# Patient Record
Sex: MALE | Race: WHITE | NOT HISPANIC OR LATINO | Employment: OTHER | URBAN - METROPOLITAN AREA
[De-identification: names, ages, dates, MRNs, and addresses within clinical notes are randomized per-mention and may not be internally consistent; named-entity substitution may affect disease eponyms.]

---

## 2017-01-04 RX ORDER — SODIUM CHLORIDE, SODIUM LACTATE, POTASSIUM CHLORIDE, CALCIUM CHLORIDE 600; 310; 30; 20 MG/100ML; MG/100ML; MG/100ML; MG/100ML
125 INJECTION, SOLUTION INTRAVENOUS CONTINUOUS
Status: CANCELLED | OUTPATIENT
Start: 2017-01-04

## 2017-01-05 ENCOUNTER — HOSPITAL ENCOUNTER (OUTPATIENT)
Facility: AMBULARY SURGERY CENTER | Age: 77
Setting detail: OUTPATIENT SURGERY
Discharge: NURSING, REHAB OR LONG-TERM CARE | End: 2017-01-05
Attending: INTERNAL MEDICINE | Admitting: INTERNAL MEDICINE
Payer: MEDICARE

## 2017-01-05 VITALS
SYSTOLIC BLOOD PRESSURE: 103 MMHG | HEART RATE: 116 BPM | TEMPERATURE: 97.3 F | RESPIRATION RATE: 18 BRPM | OXYGEN SATURATION: 95 % | DIASTOLIC BLOOD PRESSURE: 57 MMHG

## 2018-01-09 NOTE — PROCEDURES
Procedures by Raquel Deleon MD at 11/27/2016  11:39 AM      Author:  Raquel Deleon MD Service:  Critical Care/ICU Author Type:  Anesthesiologist     Filed:  11/27/2016 12:10 PM Date of Service:  11/27/2016 11:39 AM Status:  Signed     :  Raquel Deleon MD (Anesthesiologist)         Procedure Orders:       1  Insert arterial line [id number] ordered by Raquel Deleon MD at 11/27/16 1209                 Post-procedure Diagnoses:       1  Acute respiratory failure with hypoxia [J96 01]                   Arterial Line Insertion  Date/Time: 11/27/2016 12:09 PM  Performed by: Wicho Díaz by: Geovanna Reilly     Patient location:  Bedside  Consent:     Consent obtained:  Emergent situation  Universal protocol:     Immediately prior to procedure a time out was called: yes      Patient identity confirmed:  Arm band  Indications:     Indications: frequent labs / infusion    Pre-procedure details:     Skin preparation:  Chlorhexidine    Preparation: Patient was prepped and draped in sterile fashion    Anesthesia (see MAR for exact dosages): Anesthesia method:  Local infiltration  Procedure details:     Location / Tip of Catheter:  Radial    Laterality:  Left    Needle gauge:  20 G    Placement technique:  Percutaneous and Seldinger    Number of attempts:  2    Transducer: waveform confirmed    Post-procedure details:     Post-procedure:  Sterile dressing applied and wrist guard applied    CMS:  Normal    Patient tolerance of procedure:   Tolerated well, no immediate complications                     Received for:Provider  EPIC   Nov 27 2016 12:11PM [facility] Standard Time

## 2018-01-11 NOTE — PROCEDURES
Procedures by Lily Bergman RN at  10/5/2016  5:28 PM      Author:  Lily Bergman RN Service:  Oncology-Medical Author Type:  Registered Nurse     Filed:  10/5/2016  5:33 PM Date of Service:  10/5/2016  5:28 PM Status:  Signed     :  Lily Bergman RN (Registered Nurse)         Procedure Orders:       1  Insert PICC line [id number] ordered by Gloria Ray at 10/05/16 1339                    Insert PICC line  Date/Time: 10/5/2016 5:28 PM  Performed by: Benedicto Harper by: Lazaro Stark     Patient location:  Bedside  Other Assisting Provider:  No    Consent:     Consent obtained:  Written    Consent given by:  Guardian    Procedural risks discussed: Consent obtained by physician  [place] protocol:     Procedure explained and questions answered to patient or proxy's satisfaction: yes      Relevant documents present and verified: yes      Test results available and properly labeled: yes       Imaging studies available: N/A  Required blood products, implants, devices, and special equipment available: yes      Site/side marked: yes      Immediately prior to procedure, a time out was called: yes       Patient identity confirmed:  Verbally with patient, arm band, provided demographic data and hospital-assigned identification number  Pre-procedure details:     Hand hygiene: Hand hygiene performed prior to insertion      Sterile barrier technique: All elements of maximal sterile technique followed      Skin preparation:  ChloraPrep    Skin preparation agent: Skin preparation agent completely dried prior to procedure    Indications:     PICC line indications: long term antibiotics    Anesthesia (see MAR for exact dosages):      Anesthesia method:  Local infiltration    Local anesthetic:  Lidocaine 1% w/o epi  Procedure details:     Location:  Basilic    Vessel type: vein      Laterality:  Right    Approach: percutaneous technique used      Patient position:  Flat    Procedural supplies: Double lumen    Catheter size:  5 Fr    Landmarks identified: yes      Ultrasound guidance: yes      Sterile ultrasound techniques: Sterile gel and sterile probe covers were used      Number of attempts:  1    Successful placement: yes      Vessel of catheter tip end:  SVC    Total catheter length (cm):  37    Catheter out on skin (cm):  0    Max flow rate:  300ml/min    Arm circumference:  26cm  Post-procedure details:     Post-procedure:  Dressing applied and securement device placed (CHG dressing)    Assessment:  Blood return through all ports and free fluid flow (Sherlock 3CG)    Post-procedure complications: none      Patient tolerance of procedure:   Tolerated well, no immediate complications                     Received for:Provider  EPIC   Oct  5 2016  5:33PM [facility] Standard Time

## 2018-01-13 NOTE — PROCEDURES
Procedures by Lisa Rogers RN at  11/28/2016  3:33 PM      Author:  Lisa Rogers RN Service:  Oncology-Medical Author Type:  Registered Nurse     Filed:  11/28/2016  3:37 PM Date of Service:  11/28/2016  3:33 PM Status:  Signed     :  Lisa Rogers RN (Registered Nurse)         Procedure Orders:       1  Insert PICC line [id number] ordered by Shelba Dance, CRNP at 11/28/16 1056                    Insert PICC line  Date/Time: 11/28/2016 3:33 PM  Performed by: Beverly Guerra by: Teresa Tsai     Patient location:  Bedside  Other Assisting Provider:  No    Consent:     Consent obtained:  Written    Consent given by:  Guardian    Procedural risks discussed: Consent obtained by physician  [place] protocol:     Procedure explained and questions answered to patient or proxy's satisfaction: yes      Relevant documents present and verified: yes      Test results available and properly labeled: yes       Imaging studies available: n/a  Required blood products, implants, devices, and special equipment available: yes      Site/side marked: yes      Immediately prior to procedure, a time out was called: yes       Patient identity confirmed:  Verbally with patient, arm band, provided demographic data and hospital-assigned identification number  Pre-procedure details:     Hand hygiene: Hand hygiene performed prior to insertion      Sterile barrier technique: All elements of maximal sterile technique followed      Skin preparation:  ChloraPrep    Skin preparation agent: Skin preparation agent completely dried prior to procedure    Indications:     PICC line indications: other (comment)      PICC line indications comment:  Irritant medication  Anesthesia (see MAR for exact dosages):      Anesthesia method:  Local infiltration    Local anesthetic:  Lidocaine 1% w/o epi  Procedure details:     Location:  Brachial    Vessel type: vein      Laterality:  Left    Approach: percutaneous technique used      Patient position:  Flat    Procedural supplies:  Triple lumen    Catheter size:  5 Fr    Landmarks identified: yes      Ultrasound guidance: yes      Sterile ultrasound techniques: Sterile gel and sterile probe covers were used      Number of attempts:  1    Successful placement: yes      Vessel of catheter tip end:  SVC    Total catheter length (cm):  37    Catheter out on skin (cm):  0    Max flow rate:  300ml/min    Arm circumference:  26cm  Post-procedure details:     Post-procedure:  Dressing applied and securement device placed    Assessment:  Blood return through all ports and free fluid flow (Sherlock 3 CG)    Post-procedure complications: none      Patient tolerance of procedure:   Tolerated well, no immediate complications                     Received for:Provider  EPIC   Nov 28 2016  3:38PM [facility] Standard Time

## 2018-01-16 NOTE — PROCEDURES
Procedures by Li Guerra MD at 10/7/2016  2:15  PM      Author:  Li Guerra MD Service:  Critical Care/ICU Author Type:  Physician     Filed:  10/7/2016  2:19 PM Date of Service:  10/7/2016  2:15 PM Status:  Signed     :  Li Guerra MD (Physician)         Procedure Orders:       1  Bronchoscopy [[id number]] ordered by Li Guerra MD at 10/07/16 1415                 Post-procedure Diagnoses:       1  Respiratory distress [R06 00]                   Bronchoscopy  Date/Time: 10/7/2016 2:15 PM  Performed by: Daniela Trent by: Obdulio Medley     Patient location:  Bedside  Consent:     Consent obtained:  Verbal    Consent given by:  Healthcare agent    Risks discussed:  Bleeding, infection and death    Alternatives discussed:  Observation  Universal protocol:     Procedure explained and questions answered to patient or proxy's satisfaction: yes      Relevant documents present and verified: yes      Patient identity confirmed: Anonymous protocol, patient vented/unresponsive  Indications:     Procedure Purpose: diagnostic and therapeutic      Indications: pneumonia/infiltrate    Sedation:     Sedation type:  Continuous (ICU/vent)  Upper Airway:     Oropharnyx comment:  Not visualized-intubated    Epiglottis comment:  Not visualized intubated    Vocal cords movement comment:  Not visualized intubated    Trachea: normal      Leonor:  Normal  Airway:     Airway:  No lesions noted  No erythema noted  There are some thick secretions in the left main and subsequent bronchi  Pt was lavaged and no further secretions were suctioned  Procedure details:     Description:  As above  Procedures performed:     Lavage site:  Left upper lobe  Post-procedure details:     Chest x-ray performed: no      Patient tolerance of procedure: Tolerated well, no immediate complications  Final Diagnosis/Findings: Thick secretions in the left bronchi                     Received Thony LUO    Oct 14 2016  2:45PM [facility] Standard Time

## 2021-12-09 NOTE — PROCEDURES
How Severe Are Your Spot(S)?: moderate Procedures by Devaughn Bardales at 10/6/2016 10:34  PM      Author:  Devaughn Bardales Service:  Family Medicine Author Type:  Resident    Filed:  10/6/2016 10:38 PM Date of Service:  10/6/2016 10:34 PM Status:  Attested    :  Devaughn Bardales (Resident)  Cosigner:  Caroline Mathew DO at 10/7/2016  1:21 AM      Procedure Orders:       1  INTUBATION [[id number]] ordered by Devaughn Bardales at 10/06/16 2234                 Post-procedure Diagnoses:       1  Acute respiratory failure [J96 00]              Attestation signed by Caroline Mathew DO at 10/7/2016  1:21 AM           I supervised the resident on placement of this line and agree with above                                             Intubation  Date/Time: 10/6/2016 10:34 PM  Performed by: Leonel Ward by: Severa Mays     Patient location:  Bedside  Other Assisting Provider:  Yes (comment)    Consent:     Consent obtained:  Emergent situation    Alternatives discussed:  No treatment  Universal protocol:     Test results available and properly labeled: yes      Imaging studies available: yes      Required blood products, implants, devices, and special equipment available: yes      Site/side marked:  yes      Immediately prior to procedure, a time out was called: yes      Patient identity confirmed:  Arm band  Pre-procedure details:     Patient status: non verbal     Pretreatment medications:  Etomidate    Paralytics:  Succinylcholine  Indications:     Indications for intubation: respiratory distress, airway protection and other (comment)      Indications for intubation comment:  Tachypnea  Procedure details:     Preoxygenation:  Nonrebreather mask    CPR in progress: no      Intubation method:  Oral    Oral intubation technique:  Glidescope    Tube size (mm):  8 0    Number of attempts:  1    Ventilation between attempts: no      Cricoid pressure: no      Tube visualized through cords: yes    Placement assessment:     ETT to lip:  24cm    Tube What Is The Reason For Today's Visit?: Upper Body Skin Exam secured with:  ETT rojas    Breath sounds:  Equal    Placement verification: chest rise, CXR verification, direct visualization and ETCO2 detector      CXR findings:  ETT in proper place  Post-procedure details:     Patient tolerance of procedure: Tolerated well, no immediate complications  Comments:      Dr Hardik Lloyd present for entire procedure  Radha LUO    Oct  7 2016  1:22AM [facility] Standard Time

## (undated) DEVICE — Device